# Patient Record
Sex: MALE | Employment: UNEMPLOYED | URBAN - METROPOLITAN AREA
[De-identification: names, ages, dates, MRNs, and addresses within clinical notes are randomized per-mention and may not be internally consistent; named-entity substitution may affect disease eponyms.]

---

## 2023-11-18 ENCOUNTER — APPOINTMENT (OUTPATIENT)
Dept: RADIOLOGY | Facility: HOSPITAL | Age: 48
End: 2023-11-18
Payer: MEDICARE

## 2023-11-18 ENCOUNTER — HOSPITAL ENCOUNTER (EMERGENCY)
Facility: HOSPITAL | Age: 48
Discharge: HOME | End: 2023-11-18
Payer: MEDICARE

## 2023-11-18 VITALS
RESPIRATION RATE: 17 BRPM | HEART RATE: 120 BPM | WEIGHT: 215 LBS | BODY MASS INDEX: 31.84 KG/M2 | HEIGHT: 69 IN | SYSTOLIC BLOOD PRESSURE: 136 MMHG | OXYGEN SATURATION: 100 % | TEMPERATURE: 98.1 F | DIASTOLIC BLOOD PRESSURE: 84 MMHG

## 2023-11-18 DIAGNOSIS — M25.571 ACUTE RIGHT ANKLE PAIN: Primary | ICD-10-CM

## 2023-11-18 DIAGNOSIS — S09.90XA INJURY OF HEAD, INITIAL ENCOUNTER: ICD-10-CM

## 2023-11-18 PROCEDURE — 73610 X-RAY EXAM OF ANKLE: CPT | Mod: RT,FY

## 2023-11-18 PROCEDURE — 70450 CT HEAD/BRAIN W/O DYE: CPT

## 2023-11-18 PROCEDURE — 70450 CT HEAD/BRAIN W/O DYE: CPT | Performed by: SURGERY

## 2023-11-18 PROCEDURE — 99285 EMERGENCY DEPT VISIT HI MDM: CPT | Mod: 25

## 2023-11-18 PROCEDURE — 99284 EMERGENCY DEPT VISIT MOD MDM: CPT | Mod: 25

## 2023-11-18 PROCEDURE — 73610 X-RAY EXAM OF ANKLE: CPT | Mod: RIGHT SIDE | Performed by: SURGERY

## 2023-11-18 ASSESSMENT — COLUMBIA-SUICIDE SEVERITY RATING SCALE - C-SSRS
6. HAVE YOU EVER DONE ANYTHING, STARTED TO DO ANYTHING, OR PREPARED TO DO ANYTHING TO END YOUR LIFE?: NO
1. IN THE PAST MONTH, HAVE YOU WISHED YOU WERE DEAD OR WISHED YOU COULD GO TO SLEEP AND NOT WAKE UP?: NO
2. HAVE YOU ACTUALLY HAD ANY THOUGHTS OF KILLING YOURSELF?: NO

## 2023-11-18 NOTE — ED PROVIDER NOTES
EMERGENCY MEDICINE EVALUATION NOTE    History of Present Illness     Chief Complaint:   Chief Complaint   Patient presents with    Fall    Ankle Pain     Pt to ED from work. Pt states he was at work when he tripped and fell. Pt states that he has right ankle pain and a headache. Pt states that he is unsure if he hit his head when he fell. Pt denies any CP or SOB.        HPI: Randolph Blum is a 47 y.o. male presents with a chief complaint of a fall at work.  Patient states that he was wrapping a pallet and plastic when he slipped and fell off of the pallet Patient reports in the process that he twisted his right leg causing him to have pain in the right ankle.  Patient states he also has been having a headache since incident but he does not recall if he hit his head.  Patient states that he did not lose any consciousness.  Patient denies any chest pain or shortness of breath.  Patient denies any fevers or chills.  Patient denies use of any anticoagulation.  He states has been able to walk on the foot since injury occurred however they requested to come in for further evaluation as it did happen at work.    Previous History   No past medical history on file.  No past surgical history on file.     No family history on file.  Not on File  No current outpatient medications    Physical Exam     Appearance: Alert, oriented , cooperative     Skin: Intact,  dry skin, no lesions, rash, petechiae or purpura.      Eyes: PERRLA, EOMs intact,  Conjunctiva pink      ENT: Hearing grossly intact.      Neck: Supple. Trachea at midline.     Pulmonary: Clear bilaterally. No rales, rhonchi or wheezing. No accessory muscle use or stridor.     Cardiac: Normal rate and rhythm without murmur     Abdomen: Soft, nontender, active bowel sounds.     Musculoskeletal: Full range of motion.  Patient has diffuse tenderness across anterior ankle joint.  No obvious to deformity noted.  Has full range of motion of right ankle joint as well.  Neuro vas  "intact with strong pulses.  No bruising noted.  No midline C-spine tenderness noted on examination.     Neurological:Cranial nerves II through XII are grossly intact, normal sensation, no weakness, no focal findings identified.     Results   Labs Reviewed - No data to display  CT head wo IV contrast   Final Result   No evidence of acute intracranial abnormality.        MACRO:   None        Signed by: Vinayak Chew 11/18/2023 5:34 AM   Dictation workstation:   [id number]      XR ankle right 3+ views   Final Result   No evidence of acute osseous abnormality involving the right ankle.             MACRO:   None        Signed by: Vinayak Chew 11/18/2023 5:30 AM   Dictation workstation:   [id number]            ED Course & Medical Decision Making   Medications - No data to display  Heart Rate:  [120]   Temp:  [36.7 °C (98.1 °F)]   Resp:  [17]   BP: (136)/(84)   Height:  [175.3 cm (5' 9\")]   Weight:  [97.5 kg (215 lb)]   SpO2:  [98 %-100 %]    Diagnoses as of 11/18/23 0543   Acute right ankle pain   Injury of head, initial encounter     Randolph Blum is a 47 y.o. male presents with a chief complaint of a fall at work.  Patient states that he was wrapping a pallet and plastic when he slipped and fell off of the pallet Patient reports in the process that he twisted his right leg causing him to have pain in the right ankle.  Patient states he also has been having a headache since incident but he does not recall if he hit his head.  Patient states that he did not lose any consciousness.  Patient denies any chest pain or shortness of breath.  Patient denies any fevers or chills.  Patient denies use of any anticoagulation.  Patient work-up today which included CT imaging of the head due to his headache with an unknown head injury as well as ankle x-rays.  Patient's x-rays of the right ankle or within normal limits in the CT scans of the head did not show any acute abnormalities.  Patient was updated on the results.  Patient be " discharged at this time to follow-up with primary care provider 1 to 2 days return the emergency department immediately with any worsening symptoms.    Procedures   Procedures    Diagnosis     1. Acute right ankle pain    2. Injury of head, initial encounter        Disposition   Discharged    ED Prescriptions    None         Disclaimer: This note was dictated by speech recognition. Minor errors in transcription may be present. Please call if questions.       Ovi Owens PA-C  11/18/23 5522

## 2024-01-09 PROCEDURE — 99283 EMERGENCY DEPT VISIT LOW MDM: CPT

## 2024-01-10 ENCOUNTER — HOSPITAL ENCOUNTER (EMERGENCY)
Facility: HOSPITAL | Age: 49
Discharge: HOME | End: 2024-01-10
Payer: COMMERCIAL

## 2024-01-10 ENCOUNTER — HOSPITAL ENCOUNTER (EMERGENCY)
Facility: HOSPITAL | Age: 49
Discharge: HOME | End: 2024-01-10
Payer: MEDICARE

## 2024-01-10 ENCOUNTER — APPOINTMENT (OUTPATIENT)
Dept: RADIOLOGY | Facility: HOSPITAL | Age: 49
End: 2024-01-10
Payer: MEDICARE

## 2024-01-10 VITALS
WEIGHT: 212 LBS | OXYGEN SATURATION: 95 % | BODY MASS INDEX: 32.13 KG/M2 | HEIGHT: 68 IN | DIASTOLIC BLOOD PRESSURE: 80 MMHG | TEMPERATURE: 97.5 F | SYSTOLIC BLOOD PRESSURE: 123 MMHG | RESPIRATION RATE: 18 BRPM | HEART RATE: 92 BPM

## 2024-01-10 DIAGNOSIS — S80.01XA CONTUSION OF RIGHT KNEE, INITIAL ENCOUNTER: Primary | ICD-10-CM

## 2024-01-10 PROCEDURE — 4500999001 HC ED NO CHARGE

## 2024-01-10 PROCEDURE — 73564 X-RAY EXAM KNEE 4 OR MORE: CPT | Mod: RIGHT SIDE | Performed by: RADIOLOGY

## 2024-01-10 PROCEDURE — 73564 X-RAY EXAM KNEE 4 OR MORE: CPT | Mod: RT

## 2024-01-10 PROCEDURE — 2500000001 HC RX 250 WO HCPCS SELF ADMINISTERED DRUGS (ALT 637 FOR MEDICARE OP)

## 2024-01-10 RX ORDER — IBUPROFEN 600 MG/1
600 TABLET ORAL ONCE
Status: COMPLETED | OUTPATIENT
Start: 2024-01-10 | End: 2024-01-10

## 2024-01-10 RX ADMIN — IBUPROFEN 600 MG: 600 TABLET, FILM COATED ORAL at 02:07

## 2024-01-10 ASSESSMENT — PAIN SCALES - GENERAL
PAINLEVEL_OUTOF10: 8

## 2024-01-10 ASSESSMENT — PAIN DESCRIPTION - ORIENTATION: ORIENTATION: RIGHT

## 2024-01-10 ASSESSMENT — PAIN - FUNCTIONAL ASSESSMENT
PAIN_FUNCTIONAL_ASSESSMENT: 0-10
PAIN_FUNCTIONAL_ASSESSMENT: 0-10

## 2024-01-10 ASSESSMENT — PAIN DESCRIPTION - LOCATION: LOCATION: KNEE

## 2024-01-10 NOTE — ED PROVIDER NOTES
HPI   Chief Complaint   Patient presents with    Knee Pain     Per patient was by a lift to right knee at work        HPI  HPI: This is a 48 year old male who presents to the ER complaining of a knee injury.  Occurred about 3 hours prior to arrival while the patient was at work.  He states that he was walking, lifted his knee, did not see the a piece of equipment in front of his knee, and hit the anterior aspect of his right knee on a lift at work.  Reporting pain over the anterior aspect of the knee.  Denies swelling.  Denies decreased range of motion.  Denies bleeding or wounds.  Able to ambulate unassisted.  Denies numbness, tingling, or weakness to the extremity.  Denies pain in any other body part, no pain in the thigh, calf, tib-fib, ankle, or foot.  Has not taken any medications for his pain.  No other complaints or symptoms voiced.    ROS:  General: No decreased responsiveness, no fever, chills  Neuro: no numbness or tingling  Eyes: No blurry vision  Skel: + right knee pain, no neck or back pain  Cardiac: No chest pain   Resp: No shortness of breath  GI: No abdominal pain  Skin: no rash or wounds, no erythema, edema or ecchymosis  Heme: no bleeding or petechiae    PMH: reviewed  Social History: social EtOH, no drugs  Family History: Noncontributory        Gretel Coma Scale Score: 15                  Patient History   No past medical history on file.  No past surgical history on file.  No family history on file.  Social History     Tobacco Use    Smoking status: Not on file    Smokeless tobacco: Not on file   Substance Use Topics    Alcohol use: Not on file    Drug use: Not on file       Physical Exam   ED Triage Vitals [01/10/24 0015]   Temp Heart Rate Resp BP   36.4 °C (97.5 °F) 97 20 (!) 182/87      SpO2 Temp Source Heart Rate Source Patient Position   98 % Tympanic Monitor Sitting      BP Location FiO2 (%)     Left arm --       Physical Exam  General: Vital signs stable, Pt is alert, no acute  distress  Eyes: Conjunctiva normal, EOMs intact  HENMT: Normocephalic, atraumatic.  Moist mucous membranes.   Resp: Respiratory effort is normal, no retractions, no stridor.   CV: Heart is regular rate and rhythm.   Skin: No evidence of trauma, skin is warm and dry. No rashes, lesions or ulcers.  Skel: full range of motion of upper and lower extremities. No midline tenderness over the cervical thoracic or lumbar spine.  RLE: +mild TTP over the anterior knee. Nontender over the medial or lateral joint lines or popliteal space. No asymmetric swelling of the knee, no swelling of the thigh, knee, calf, or ankle.  No pitting edema. The extensor mechanism is intact. Full nonpainful range of motion of the knee intact with flexion and extension. There is no obvious laxity.  The remainder of the exam, specifically the tib-fib, ankle, and foot, are nontender. The patella tracks normally. Achilles is intact. There is no warmth or erythema. No cords.  Compartments are soft to palpation.  Skin is intact. Is neurovascularly intact distally, 2+ DP pulses, cap refill <2 sec, warm and well perfused, sensation intact and equal throughout the BLE.   Neuro: Normal gait, no motor or sensory changes.  Psych: Alert and oriented ×3, judgment is appropriate, normal mood and affect   ED Course & MDM   Diagnoses as of 01/10/24 0141   Contusion of right knee, initial encounter     Medical Decision Making  ED course / MDM     Summary:  Patient presented with a right knee injury.  Hit the anterior knee on a lift.  Vital signs stable, patient is very well-appearing.  Ambulates unassisted with steady gait.  Mild tenderness over the anterior knee.  No swelling.  No decreased range of motion.  No wounds, skin is intact.  No other complaints or injuries.  X-ray shows no acute fracture or dislocation.  Given a dose of ibuprofen in the ED. Results and differential were discussed in detail with the patient.  No physical exam or history findings  concerning for an occult fracture, knee instability, dislocation, compartment syndrome, or DVT.  Consistent with a minor contusion.  Patient is in agreement the plan for discharge with outpatient follow-up.  Given an Ace wrap in the ED. Patient was given strict return precautions, understands reasons to return to the ED. Also discussed supportive care instructions. I expressed the importance of outpatient follow up with their PCP. All questions were answered, patient expressed understanding and stated that they would comply.    Patient was advised to follow up with PCP or recommended provider in 2-3 days for another evaluation and exam. I advised patient and family/friend/caregiver/guardian to return or go to closest emergency room immediately if symptoms change, get worse, new symptoms develop prior to follow up. If there is no improvement in symptoms in the next 24 hours they are advised to return for further evaluation and exam. I also explained the plan and treatment course. Patient and family/friend/caregiver/guardian is in agreement with plan, treatment course, and follow up and states verbally that they will comply.    Impression:  1. See diagnosis    Plan: Homegoing. I discussed the differential, results, and discharge plan with the patient and family/friend/caregiver. I emphasized the importance of follow-up with the physician I referred them to in the timeframe recommended.  I explained reasons for the patient to return to the Emergency Department. They agreed that if they feel their condition is worsening or if they have any other concern they should call 911 immediately for further assistance. We also discussed medications that were prescribed including common side effects and interactions. The patient was advised to abstain from driving, operating heavy machinery, or making significant decisions while taking medications such as opiates and muscle relaxers that may impair this. I gave the patient an  opportunity to ask all questions they had and answered all of them accordingly. They understand return precautions and discharge instructions. The patient and family/friend/caregiver expressed understanding verbally and that they would comply.       Disposition: Discharge    This note has been transcribed using voice recognition and may contain grammatical errors, misplaced words, incorrect words, incorrect phrases or other errors.   Procedure  Procedures     Lydia Horton PA-C  01/10/24 0146

## 2024-01-10 NOTE — Clinical Note
Randolph Blum was seen and treated in our emergency department on 1/9/2024.  He may return to work on 01/15/2024.       If you have any questions or concerns, please don't hesitate to call.      Lydia Horton PA-C

## 2024-01-11 ENCOUNTER — APPOINTMENT (OUTPATIENT)
Dept: PRIMARY CARE | Facility: CLINIC | Age: 49
End: 2024-01-11
Payer: MEDICARE